# Patient Record
Sex: MALE | Race: BLACK OR AFRICAN AMERICAN | Employment: OTHER
[De-identification: names, ages, dates, MRNs, and addresses within clinical notes are randomized per-mention and may not be internally consistent; named-entity substitution may affect disease eponyms.]

---

## 2023-05-15 ENCOUNTER — APPOINTMENT (OUTPATIENT)
Facility: HOSPITAL | Age: 73
DRG: 286 | End: 2023-05-15
Payer: MEDICARE

## 2023-05-15 ENCOUNTER — HOSPITAL ENCOUNTER (INPATIENT)
Facility: HOSPITAL | Age: 73
LOS: 2 days | Discharge: HOME | DRG: 286 | End: 2023-05-17
Attending: STUDENT IN AN ORGANIZED HEALTH CARE EDUCATION/TRAINING PROGRAM | Admitting: EMERGENCY MEDICINE
Payer: MEDICARE

## 2023-05-15 DIAGNOSIS — I50.9 CONGESTIVE HEART FAILURE (CHF) (HCC): ICD-10-CM

## 2023-05-15 DIAGNOSIS — J96.01 ACUTE RESPIRATORY FAILURE WITH HYPOXIA (HCC): Primary | ICD-10-CM

## 2023-05-15 PROBLEM — E11.69 TYPE 2 DIABETES MELLITUS WITH OTHER SPECIFIED COMPLICATION (HCC): Status: ACTIVE | Noted: 2023-05-15

## 2023-05-15 PROBLEM — J98.01 BRONCHOSPASM: Status: ACTIVE | Noted: 2023-05-15

## 2023-05-15 PROBLEM — J96.00 ACUTE RESPIRATORY FAILURE (HCC): Status: ACTIVE | Noted: 2023-05-15

## 2023-05-15 PROBLEM — E78.5 DYSLIPIDEMIA: Status: ACTIVE | Noted: 2023-05-15

## 2023-05-15 LAB
ANION GAP SERPL CALC-SCNC: 8 MMOL/L (ref 3–18)
ARTERIAL PATENCY WRIST A: POSITIVE
BASE DEFICIT BLD-SCNC: 1.4 MMOL/L
BASOPHILS # BLD: 0.1 K/UL (ref 0–0.1)
BASOPHILS NFR BLD: 1 % (ref 0–2)
BDY SITE: ABNORMAL
BUN SERPL-MCNC: 10 MG/DL (ref 7–18)
BUN/CREAT SERPL: 10 (ref 12–20)
CALCIUM SERPL-MCNC: 8.3 MG/DL (ref 8.5–10.1)
CHLORIDE SERPL-SCNC: 104 MMOL/L (ref 100–111)
CO2 SERPL-SCNC: 26 MMOL/L (ref 21–32)
CREAT SERPL-MCNC: 1 MG/DL (ref 0.6–1.3)
DIFFERENTIAL METHOD BLD: ABNORMAL
EKG ATRIAL RATE: 85 BPM
EKG DIAGNOSIS: NORMAL
EKG P AXIS: 65 DEGREES
EKG P-R INTERVAL: 162 MS
EKG Q-T INTERVAL: 380 MS
EKG QRS DURATION: 82 MS
EKG QTC CALCULATION (BAZETT): 452 MS
EKG R AXIS: 59 DEGREES
EKG T AXIS: 243 DEGREES
EKG VENTRICULAR RATE: 85 BPM
EOSINOPHIL # BLD: 0.8 K/UL (ref 0–0.4)
EOSINOPHIL NFR BLD: 8 % (ref 0–5)
ERYTHROCYTE [DISTWIDTH] IN BLOOD BY AUTOMATED COUNT: 16.8 % (ref 11.6–14.5)
GAS FLOW.O2 O2 DELIVERY SYS: ABNORMAL
GAS FLOW.O2 SETTING OXYMISER: 10 BPM
GLUCOSE BLD STRIP.AUTO-MCNC: 222 MG/DL (ref 70–110)
GLUCOSE BLD STRIP.AUTO-MCNC: 237 MG/DL (ref 70–110)
GLUCOSE BLD STRIP.AUTO-MCNC: 259 MG/DL (ref 70–110)
GLUCOSE SERPL-MCNC: 262 MG/DL (ref 74–99)
HCO3 BLD-SCNC: 23.2 MMOL/L (ref 22–26)
HCT VFR BLD AUTO: 31.4 % (ref 36–48)
HGB BLD-MCNC: 9.7 G/DL (ref 13–16)
IMM GRANULOCYTES # BLD AUTO: 0.1 K/UL (ref 0–0.04)
IMM GRANULOCYTES NFR BLD AUTO: 1 % (ref 0–0.5)
IPAP/PIP/HIGH PEEP: 14
LYMPHOCYTES # BLD: 1.8 K/UL (ref 0.9–3.6)
LYMPHOCYTES NFR BLD: 18 % (ref 21–52)
MCH RBC QN AUTO: 26.2 PG (ref 24–34)
MCHC RBC AUTO-ENTMCNC: 30.9 G/DL (ref 31–37)
MCV RBC AUTO: 84.9 FL (ref 78–100)
MONOCYTES # BLD: 0.8 K/UL (ref 0.05–1.2)
MONOCYTES NFR BLD: 8 % (ref 3–10)
NEUTS SEG # BLD: 6.1 K/UL (ref 1.8–8)
NEUTS SEG NFR BLD: 64 % (ref 40–73)
NRBC # BLD: 0 K/UL (ref 0–0.01)
NRBC BLD-RTO: 0 PER 100 WBC
NT PRO BNP: 1310 PG/ML (ref 0–900)
O2/TOTAL GAS SETTING VFR VENT: 80 %
PCO2 BLD: 37.6 MMHG (ref 35–45)
PEEP RESPIRATORY: 6 CMH2O
PH BLD: 7.4 (ref 7.35–7.45)
PLATELET # BLD AUTO: 221 K/UL (ref 135–420)
PMV BLD AUTO: 9.6 FL (ref 9.2–11.8)
PO2 BLD: 386 MMHG (ref 80–100)
POTASSIUM SERPL-SCNC: 4.6 MMOL/L (ref 3.5–5.5)
RBC # BLD AUTO: 3.7 M/UL (ref 4.35–5.65)
RESPIRATORY RATE, POC: 12 (ref 5–40)
SAO2 % BLD: 100 % (ref 92–97)
SERVICE CMNT-IMP: ABNORMAL
SODIUM SERPL-SCNC: 138 MMOL/L (ref 136–145)
SPECIMEN TYPE: ABNORMAL
TROPONIN I SERPL HS-MCNC: 59 NG/L (ref 0–78)
TROPONIN I SERPL HS-MCNC: 73 NG/L (ref 0–78)
VT SETTING VENT: 872 ML
WBC # BLD AUTO: 9.5 K/UL (ref 4.6–13.2)

## 2023-05-15 PROCEDURE — 6360000002 HC RX W HCPCS: Performed by: INTERNAL MEDICINE

## 2023-05-15 PROCEDURE — 80048 BASIC METABOLIC PNL TOTAL CA: CPT

## 2023-05-15 PROCEDURE — 71045 X-RAY EXAM CHEST 1 VIEW: CPT

## 2023-05-15 PROCEDURE — 36600 WITHDRAWAL OF ARTERIAL BLOOD: CPT

## 2023-05-15 PROCEDURE — 82962 GLUCOSE BLOOD TEST: CPT

## 2023-05-15 PROCEDURE — 82785 ASSAY OF IGE: CPT

## 2023-05-15 PROCEDURE — 93010 ELECTROCARDIOGRAM REPORT: CPT | Performed by: INTERNAL MEDICINE

## 2023-05-15 PROCEDURE — 1100000000 HC RM PRIVATE

## 2023-05-15 PROCEDURE — 83880 ASSAY OF NATRIURETIC PEPTIDE: CPT

## 2023-05-15 PROCEDURE — 82803 BLOOD GASES ANY COMBINATION: CPT

## 2023-05-15 PROCEDURE — 93005 ELECTROCARDIOGRAM TRACING: CPT | Performed by: STUDENT IN AN ORGANIZED HEALTH CARE EDUCATION/TRAINING PROGRAM

## 2023-05-15 PROCEDURE — 93306 TTE W/DOPPLER COMPLETE: CPT

## 2023-05-15 PROCEDURE — 85025 COMPLETE CBC W/AUTO DIFF WBC: CPT

## 2023-05-15 PROCEDURE — 94660 CPAP INITIATION&MGMT: CPT

## 2023-05-15 PROCEDURE — 6360000002 HC RX W HCPCS: Performed by: PHYSICIAN ASSISTANT

## 2023-05-15 PROCEDURE — 5A09357 ASSISTANCE WITH RESPIRATORY VENTILATION, LESS THAN 24 CONSECUTIVE HOURS, CONTINUOUS POSITIVE AIRWAY PRESSURE: ICD-10-PCS | Performed by: INTERNAL MEDICINE

## 2023-05-15 PROCEDURE — 6370000000 HC RX 637 (ALT 250 FOR IP): Performed by: PHYSICIAN ASSISTANT

## 2023-05-15 PROCEDURE — 94640 AIRWAY INHALATION TREATMENT: CPT

## 2023-05-15 PROCEDURE — 36415 COLL VENOUS BLD VENIPUNCTURE: CPT

## 2023-05-15 PROCEDURE — 6370000000 HC RX 637 (ALT 250 FOR IP): Performed by: INTERNAL MEDICINE

## 2023-05-15 PROCEDURE — 94761 N-INVAS EAR/PLS OXIMETRY MLT: CPT

## 2023-05-15 PROCEDURE — 2700000000 HC OXYGEN THERAPY PER DAY

## 2023-05-15 PROCEDURE — 84484 ASSAY OF TROPONIN QUANT: CPT

## 2023-05-15 PROCEDURE — 93306 TTE W/DOPPLER COMPLETE: CPT | Performed by: INTERNAL MEDICINE

## 2023-05-15 PROCEDURE — 99223 1ST HOSP IP/OBS HIGH 75: CPT | Performed by: INTERNAL MEDICINE

## 2023-05-15 PROCEDURE — 99285 EMERGENCY DEPT VISIT HI MDM: CPT

## 2023-05-15 RX ORDER — CYCLOSPORINE 0.5 MG/ML
1 EMULSION OPHTHALMIC 2 TIMES DAILY
Status: DISCONTINUED | OUTPATIENT
Start: 2023-05-15 | End: 2023-05-18 | Stop reason: HOSPADM

## 2023-05-15 RX ORDER — ATORVASTATIN CALCIUM 40 MG/1
40 TABLET, FILM COATED ORAL DAILY
Status: DISCONTINUED | OUTPATIENT
Start: 2023-05-15 | End: 2023-05-15

## 2023-05-15 RX ORDER — INSULIN LISPRO 100 [IU]/ML
0-4 INJECTION, SOLUTION INTRAVENOUS; SUBCUTANEOUS NIGHTLY
Status: DISCONTINUED | OUTPATIENT
Start: 2023-05-15 | End: 2023-05-18 | Stop reason: HOSPADM

## 2023-05-15 RX ORDER — ATORVASTATIN CALCIUM 40 MG/1
40 TABLET, FILM COATED ORAL DAILY
COMMUNITY
Start: 2023-03-06

## 2023-05-15 RX ORDER — ASPIRIN 81 MG/1
81 TABLET, CHEWABLE ORAL DAILY
COMMUNITY

## 2023-05-15 RX ORDER — INSULIN LISPRO 100 [IU]/ML
0-4 INJECTION, SOLUTION INTRAVENOUS; SUBCUTANEOUS
Status: DISCONTINUED | OUTPATIENT
Start: 2023-05-15 | End: 2023-05-18 | Stop reason: HOSPADM

## 2023-05-15 RX ORDER — ACETAMINOPHEN 325 MG/1
650 TABLET ORAL EVERY 4 HOURS PRN
Status: DISCONTINUED | OUTPATIENT
Start: 2023-05-15 | End: 2023-05-18 | Stop reason: HOSPADM

## 2023-05-15 RX ORDER — ATORVASTATIN CALCIUM 40 MG/1
40 TABLET, FILM COATED ORAL NIGHTLY
Status: DISCONTINUED | OUTPATIENT
Start: 2023-05-15 | End: 2023-05-18 | Stop reason: HOSPADM

## 2023-05-15 RX ORDER — BUDESONIDE 0.5 MG/2ML
0.5 INHALANT ORAL 2 TIMES DAILY
Status: DISCONTINUED | OUTPATIENT
Start: 2023-05-15 | End: 2023-05-18 | Stop reason: HOSPADM

## 2023-05-15 RX ORDER — ONDANSETRON 2 MG/ML
4 INJECTION INTRAMUSCULAR; INTRAVENOUS EVERY 6 HOURS PRN
Status: DISCONTINUED | OUTPATIENT
Start: 2023-05-15 | End: 2023-05-18 | Stop reason: HOSPADM

## 2023-05-15 RX ORDER — ARFORMOTEROL TARTRATE 15 UG/2ML
15 SOLUTION RESPIRATORY (INHALATION) 2 TIMES DAILY
Status: DISCONTINUED | OUTPATIENT
Start: 2023-05-15 | End: 2023-05-18 | Stop reason: HOSPADM

## 2023-05-15 RX ORDER — MEMANTINE HYDROCHLORIDE 5 MG/1
7.5 TABLET ORAL 2 TIMES DAILY
Status: DISCONTINUED | OUTPATIENT
Start: 2023-05-15 | End: 2023-05-18 | Stop reason: HOSPADM

## 2023-05-15 RX ORDER — BISACODYL 10 MG
10 SUPPOSITORY, RECTAL RECTAL DAILY PRN
Status: DISCONTINUED | OUTPATIENT
Start: 2023-05-15 | End: 2023-05-18 | Stop reason: HOSPADM

## 2023-05-15 RX ORDER — TRAVOPROST OPHTHALMIC SOLUTION 0.04 MG/ML
1 SOLUTION OPHTHALMIC NIGHTLY
Status: ON HOLD | COMMUNITY
End: 2023-05-17 | Stop reason: HOSPADM

## 2023-05-15 RX ORDER — FUROSEMIDE 10 MG/ML
40 INJECTION INTRAMUSCULAR; INTRAVENOUS DAILY
Status: DISCONTINUED | OUTPATIENT
Start: 2023-05-15 | End: 2023-05-15

## 2023-05-15 RX ORDER — HEPARIN SODIUM 5000 [USP'U]/ML
5000 INJECTION, SOLUTION INTRAVENOUS; SUBCUTANEOUS EVERY 8 HOURS SCHEDULED
Status: DISCONTINUED | OUTPATIENT
Start: 2023-05-15 | End: 2023-05-18 | Stop reason: HOSPADM

## 2023-05-15 RX ORDER — FUROSEMIDE 10 MG/ML
20 INJECTION INTRAMUSCULAR; INTRAVENOUS 2 TIMES DAILY
Status: DISCONTINUED | OUTPATIENT
Start: 2023-05-15 | End: 2023-05-16

## 2023-05-15 RX ORDER — PREDNISONE 20 MG/1
40 TABLET ORAL DAILY
Status: DISCONTINUED | OUTPATIENT
Start: 2023-05-15 | End: 2023-05-18 | Stop reason: HOSPADM

## 2023-05-15 RX ORDER — IPRATROPIUM BROMIDE AND ALBUTEROL SULFATE 2.5; .5 MG/3ML; MG/3ML
1 SOLUTION RESPIRATORY (INHALATION) EVERY 4 HOURS PRN
Status: DISCONTINUED | OUTPATIENT
Start: 2023-05-15 | End: 2023-05-18 | Stop reason: HOSPADM

## 2023-05-15 RX ORDER — TIMOLOL MALEATE 5 MG/ML
1 SOLUTION/ DROPS OPHTHALMIC 2 TIMES DAILY
Status: DISCONTINUED | OUTPATIENT
Start: 2023-05-15 | End: 2023-05-18 | Stop reason: HOSPADM

## 2023-05-15 RX ORDER — PREDNISOLONE ACETATE 10 MG/ML
1 SUSPENSION/ DROPS OPHTHALMIC DAILY
Status: DISCONTINUED | OUTPATIENT
Start: 2023-05-15 | End: 2023-05-18 | Stop reason: HOSPADM

## 2023-05-15 RX ORDER — LATANOPROST 50 UG/ML
1 SOLUTION/ DROPS OPHTHALMIC NIGHTLY
Status: DISCONTINUED | OUTPATIENT
Start: 2023-05-15 | End: 2023-05-18 | Stop reason: HOSPADM

## 2023-05-15 RX ORDER — GLIMEPIRIDE 2 MG/1
1 TABLET ORAL 3 TIMES DAILY
COMMUNITY

## 2023-05-15 RX ORDER — LATANOPROST 50 UG/ML
1 SOLUTION/ DROPS OPHTHALMIC DAILY
COMMUNITY
Start: 2022-02-02

## 2023-05-15 RX ORDER — DEXTROSE MONOHYDRATE 100 MG/ML
INJECTION, SOLUTION INTRAVENOUS CONTINUOUS PRN
Status: DISCONTINUED | OUTPATIENT
Start: 2023-05-15 | End: 2023-05-18 | Stop reason: HOSPADM

## 2023-05-15 RX ORDER — PREDNISOLONE ACETATE 10 MG/ML
1 SUSPENSION/ DROPS OPHTHALMIC DAILY
COMMUNITY
Start: 2023-03-20

## 2023-05-15 RX ORDER — ASPIRIN 81 MG/1
81 TABLET, CHEWABLE ORAL DAILY
Status: DISCONTINUED | OUTPATIENT
Start: 2023-05-15 | End: 2023-05-18 | Stop reason: HOSPADM

## 2023-05-15 RX ORDER — CYCLOSPORINE 0.5 MG/ML
1 EMULSION OPHTHALMIC 2 TIMES DAILY
COMMUNITY

## 2023-05-15 RX ADMIN — FUROSEMIDE 20 MG: 10 INJECTION, SOLUTION INTRAMUSCULAR; INTRAVENOUS at 18:06

## 2023-05-15 RX ADMIN — PREDNISOLONE ACETATE 1 DROP: 10 SUSPENSION/ DROPS OPHTHALMIC at 13:31

## 2023-05-15 RX ADMIN — BUDESONIDE 500 MCG: 0.5 INHALANT RESPIRATORY (INHALATION) at 21:34

## 2023-05-15 RX ADMIN — FUROSEMIDE 20 MG: 10 INJECTION, SOLUTION INTRAMUSCULAR; INTRAVENOUS at 13:20

## 2023-05-15 RX ADMIN — TIMOLOL MALEATE 1 DROP: 5 SOLUTION OPHTHALMIC at 23:01

## 2023-05-15 RX ADMIN — BUDESONIDE 500 MCG: 0.5 INHALANT RESPIRATORY (INHALATION) at 13:18

## 2023-05-15 RX ADMIN — HEPARIN SODIUM 5000 UNITS: 5000 INJECTION INTRAVENOUS; SUBCUTANEOUS at 23:02

## 2023-05-15 RX ADMIN — ARFORMOTEROL TARTRATE 15 MCG: 15 SOLUTION RESPIRATORY (INHALATION) at 13:18

## 2023-05-15 RX ADMIN — HEPARIN SODIUM 5000 UNITS: 5000 INJECTION INTRAVENOUS; SUBCUTANEOUS at 13:18

## 2023-05-15 RX ADMIN — LATANOPROST 1 DROP: 50 SOLUTION OPHTHALMIC at 23:02

## 2023-05-15 RX ADMIN — ATORVASTATIN CALCIUM 40 MG: 40 TABLET, FILM COATED ORAL at 23:03

## 2023-05-15 RX ADMIN — INSULIN LISPRO 1 UNITS: 100 INJECTION, SOLUTION INTRAVENOUS; SUBCUTANEOUS at 18:06

## 2023-05-15 RX ADMIN — ASPIRIN 81 MG CHEWABLE TABLET 81 MG: 81 TABLET CHEWABLE at 13:19

## 2023-05-15 RX ADMIN — ARFORMOTEROL TARTRATE 15 MCG: 15 SOLUTION RESPIRATORY (INHALATION) at 21:34

## 2023-05-15 RX ADMIN — PREDNISONE 40 MG: 20 TABLET ORAL at 13:19

## 2023-05-15 ASSESSMENT — ENCOUNTER SYMPTOMS
ABDOMINAL PAIN: 0
VOMITING: 0
SHORTNESS OF BREATH: 1
CHEST TIGHTNESS: 0
DIARRHEA: 0
SORE THROAT: 0
NAUSEA: 0

## 2023-05-15 NOTE — CONSULTS
[facility]. Pulmonary, Critical Care, and Sleep Medicine    Initial Patient Referral report    Name: Daniel Grant MRN: [id number]   : 1950 Hospital: [facility]   Date: 5/15/2023        IMPRESSION:   Acute hypoxic respiratory failure secondary to acute bronchospasm-differential diagnosis acute exacerbation of asthmatic bronchitis versus cardiac asthma-acute CHF. Patient has responded to current treatment but needs further evaluation  History of atrial fibrillation  Eosinophilia-8% noted on differential diagnosis suspicious for allergic etiologies  History of recent dyspnea on exertion and fatigue-needs further evaluation  Ex-smoker-quit 10 years back       Patient Active Problem List   Diagnosis    Microscopic hematuria    Acute respiratory failure (HCC)      RECOMMENDATIONS:   Oxygen-given to maintain saturation more than 92%  BIPAP/CPAP-can be added to decrease work of breathing should he have any further recurrence  Bronchial hygiene protocol  Bronchodilators-I would treat him with structured bronchodilator regime-Brovona and Pulmicort started with as needed DuoNebs. Steroids-add prednisone 40 mg daily for 5 days for exacerbation pending further work-up  Antibiotics-not needed  Aspiration precautions  We will check serum IgE level. If indicated will pursue further work-up and interventions  Recommend further cardiology evaluation-echocardiogram and cardiac consult  Out patient testing- PFT, 6 min walk,  Assess home Oxygen needs at discharge  OT, PT, OOB and ambulate  Healthy weight  Will Follow  DVT, PUD prophylaxis     Subjective:   05/15/23   This patient has been seen and evaluated at the request of Dr. Erica Zavaleta for acute respiratory failure. Patient is a 67 y.o. male with past medical history of atrial fibrillation and diabetes presenting to the emergency department via EMS for evaluation of respiratory distress.     Patient was sleeping when he started
admitted for Acute respiratory failure ([facility].) [J96.00]. Patient complains of: MAGNO Mercer is a 67 y.o. male, pmhx as stated above, who we are seeing in consult for concerns of acute CHF. Patient reports waking up from his sleep gasping for air. He states he was extremely SOB with wheezing, states he has never experienced this before. He states he felt well prior to going to bed. He admits to having less endurance over the last few months. He says if he walks around [employer], he would have to sit down because of SOB. This would subsequently improve with rest. Per EMS, patients O2 saturations were 80% on room air and he was subsequently placed on bipap. Denies CP, weight gain, orthopnea, LE edema, diaphoresis, N/V/D, dizziness, near syncope or syncope. Cardiac risk factors: advanced age (older than 54 for men, 72 for women), diabetes mellitus, dyslipidemia, hypertension, male gender, and smoking/ tobacco exposure  Review of Symptoms:  Except as stated above include:  Constitutional:  negative  Respiratory:  as per HPI   Cardiovascular:  negative  Gastrointestinal: negative  Genitourinary:  negative  Musculoskeletal:  Negative  Neurological:  Negative  Dermatological:  Negative  Endocrinological: Negative  Psychological:  Negative    Pertinent items are noted in HPI. Past Medical History:   No past medical history on file. Social History:     Social History     Socioeconomic History    Marital status: Single        Family History:   No family history on file. Medications:   No Known Allergies     No current facility-administered medications for this encounter. No current outpatient medications on file.          Physical Exam:     Vitals:    05/15/23 1000   BP: 118/73   Pulse: 89   Resp: 19   Temp:    SpO2:        TELE: normal sinus rhythm    BP Readings from Last 3 Encounters:   05/15/23 118/73     Pulse Readings from Last 3 Encounters:   05/15/23 89     Wt Readings from Last 3 Encounters:

## 2023-05-15 NOTE — H&P
History and Physical    Patient: Jerry Willett               Sex: male          DOA: 5/15/2023       YOB: 1950      Age:  67 y.o.        LOS:  LOS: 0 days        Chief complaint: Dyspnea on exertion and wheezing since early this morning    HPI:     Jerry Willett is a 67 y.o. male who presented to emergency room for further evaluation of sudden onset of shortness of breath started this morning. Patient also had wheezing. However, he has had some dyspnea hydration for more than a month and feeling tired. Patient says he is in process to move out of this town to [facility] and he is cleaning his home and also exposing himself to dust.  He called EMS due to respiratory distress. He was found out to have hypoxia with SPO2 of 86 percentage on room air and was placed on CPAP. He was given sublingual nitro and was also given D10 as his blood sugar was 48. Patient is currently feeling much better. He is now on oxygen via nasal cannula. Denies any chest pain or abdominal pain. No nausea or vomiting. Had 2 loose bowel movement yesterday but no constipation. No tingling or numbness. No headaches or dizziness. Shortness of breath getting better. No wheezes currently. He says that he had asthma as a child. He was also diagnosed with atrial fibrillation in 1999 and was following cardiologist for 2 years. He has history of glaucoma and under care of an ophthalmologist.  He is an ex-smoker. Does not smoke cigarettes or drink any alcohol currently. No past medical history on file.     Social History     Socioeconomic History    Marital status: Single     Spouse name: Not on file    Number of children: Not on file    Years of education: Not on file    Highest education level: Not on file   Occupational History    Not on file   Tobacco Use    Smoking status: Not on file    Smokeless tobacco: Not on file   Substance and Sexual Activity    Alcohol use: Not on file    Drug use: Not on file

## 2023-05-15 NOTE — ED NOTES
CXR completed. Labs collected, labeled, and walked to lab for processing. EKG completed at this time. Provided to and signed by MD. YUSUF at bedside to obtain ABG's.       Bang Ibrahim RN  05/15/23 8738

## 2023-05-15 NOTE — ED NOTES
Report was given to CHEMA May RN pt transported by patient transport.          Sheron Montalvo RN  05/15/23 1041

## 2023-05-15 NOTE — ED NOTES
Respiratory therapist notified that BIPAP was removed. Pt tolerating 2L NC at this time. BIPAP remains at bedside if needed.       Chidi Lino RN  05/15/23 0082

## 2023-05-15 NOTE — ED NOTES
MD requesting pt trial off of BIPAP    Pt taken off BIPAP at this time and placed on 2L NC.         Shira Hamlin RN  05/15/23 8788

## 2023-05-15 NOTE — ED NOTES
Assume care of patient at time of shift change  Bedside report received from ALBERTO Corona. Pt. Seen by Dr. Tony Dolan and patient was taken off bi-pap and placed on oxygen via NC. Is tolerating. Oxygen saturation remain high 90s.        Samuel Linares, RN  05/15/23 [address] [place] [address], RN  05/15/23 2833

## 2023-05-15 NOTE — ED PROVIDER NOTES
EMERGENCY DEPARTMENT HISTORY AND PHYSICAL EXAM      Date: 5/15/2023  Patient Name: Aditya Taylor    History of Presenting Illness     Chief Complaint   Patient presents with    Shortness of Breath       Patient is a 66-year-old male with past medical history of atrial fibrillation and diabetes presenting to the emergency department via EMS for evaluation of respiratory distress. Patient was sleeping when he started wheezing and felt extremely short of breath all of a sudden. Was found to be 86% on room air for EMS and was placed on CPAP. Sublingual nitro was given by EMS x2. His blood sugar was found to be 48 and patient was infused D10 in route. On arrival, patient states that his breathing is feeling improved. He is alert and oriented. Patient denies history of heart failure or COPD. Denies palpitations or chest pain, cough, fevers or chills        PCP: None None    No current facility-administered medications for this encounter. No current outpatient medications on file. Past History     Past Medical History:  No past medical history on file. Past Surgical History:  No past surgical history on file. Family History:  No family history on file. Social History: Allergies:  No Known Allergies      Review of Systems       Review of Systems   Constitutional:  Negative for activity change, appetite change, fatigue and fever. HENT:  Negative for congestion and sore throat. Respiratory:  Positive for shortness of breath. Negative for chest tightness. Cardiovascular:  Negative for chest pain. Gastrointestinal:  Negative for abdominal pain, diarrhea, nausea and vomiting. Genitourinary:  Negative for difficulty urinating, dysuria, flank pain, hematuria and urgency. Musculoskeletal:  Negative for arthralgias. Skin:  Negative for rash. Neurological:  Negative for dizziness, weakness, light-headedness, numbness and headaches. Psychiatric/Behavioral:  Negative for agitation.

## 2023-05-15 NOTE — ED PROVIDER NOTES
ED continued care note    7:20 AM EDT  Signed out to me by Dr. Yadira Morillo at 7 AM, patient with respiratory distress, 80% for EMS on room air, 2 sublingual nitro by EMS. Placed on BiPAP here and doing well with this symptoms improved. Reviewed at 7:20 AM EDT  Patient Vitals for the past 12 hrs:   Temp Pulse Resp BP SpO2   05/15/23 0707 -- 83 16 115/66 100 %   05/15/23 0600 -- 84 15 113/60 100 %   05/15/23 0559 -- 89 22 -- 100 %   05/15/23 0556 97.7 °F (36.5 °C) 88 13 113/60 100 %       ED Course as of 05/15/23 0829   Mon May 15, 2023   0732 We will try to get him off of BiPAP, will admit, no explanation for this episode. [CB]      ED Course User Index  [CB] Susanna Anderson MD   8:29 AM patient is now off of BiPAP doing well on a nasal cannula resting comfortably, discussed with Dr. Shabnam Young hospitalist will admit the patient, possibly first episode of CHF he had rales and little bit of fluid on his chest x-ray. He is not an extremis anymore but has no explanation for this episode and had a documented hypoxia on room air. He will be admitted      Clinical Impression:   1.  Acute respiratory failure with hypoxia ([facility]         Susanna Anderson MD  05/15/23 0175

## 2023-05-15 NOTE — ED TRIAGE NOTES
Pt arrives via EMS [facility]9 with complaints of SOB x1 hour. States he was trying to sleep when he was unable to do so d/t wheezing and SOB which came on suddenly. Per medics, SPO2 86% on room air. Placed pt on CPAP with SPO2 WNL. SL Nitro given x2. EMS BS 48.   2 PIVs placed by EMS- R AC and L AC. D10 infusing upon arrival.       Pt arrives awake, alert, and oriented x4. No complaints of pain. Speaking in full complete sentences. Pt transitioned from EMS CPAP to BIPAP. RT at bedside managing airway. SPO2 100% on BIPAP. /60  Sinus Rhythm with HR 80's    POC       Self reported medical Hx of DM II and Afib. No past medical history on file.

## 2023-05-16 PROBLEM — D72.10 EOSINOPHILIA: Status: ACTIVE | Noted: 2023-05-16

## 2023-05-16 PROBLEM — J45.31 MILD PERSISTENT ASTHMA WITH ACUTE EXACERBATION: Status: ACTIVE | Noted: 2023-05-16

## 2023-05-16 LAB
ACT BLD: 221 SECS (ref 79–138)
ALBUMIN SERPL-MCNC: 2.9 G/DL (ref 3.4–5)
ALBUMIN/GLOB SERPL: 0.6 (ref 0.8–1.7)
ALP SERPL-CCNC: 94 U/L (ref 45–117)
ALT SERPL-CCNC: 42 U/L (ref 16–61)
ANION GAP SERPL CALC-SCNC: 8 MMOL/L (ref 3–18)
AST SERPL-CCNC: 36 U/L (ref 10–38)
BASOPHILS # BLD: 0 K/UL (ref 0–0.1)
BASOPHILS NFR BLD: 0 % (ref 0–2)
BILIRUB SERPL-MCNC: 0.4 MG/DL (ref 0.2–1)
BUN SERPL-MCNC: 13 MG/DL (ref 7–18)
BUN/CREAT SERPL: 15 (ref 12–20)
CALCIUM SERPL-MCNC: 8.7 MG/DL (ref 8.5–10.1)
CHLORIDE SERPL-SCNC: 107 MMOL/L (ref 100–111)
CHOLEST SERPL-MCNC: 93 MG/DL
CO2 SERPL-SCNC: 24 MMOL/L (ref 21–32)
CREAT SERPL-MCNC: 0.87 MG/DL (ref 0.6–1.3)
DIFFERENTIAL METHOD BLD: ABNORMAL
ECHO BSA: 2.1 M2
EOSINOPHIL # BLD: 0.1 K/UL (ref 0–0.4)
EOSINOPHIL NFR BLD: 1 % (ref 0–5)
ERYTHROCYTE [DISTWIDTH] IN BLOOD BY AUTOMATED COUNT: 16.8 % (ref 11.6–14.5)
GLOBULIN SER CALC-MCNC: 4.5 G/DL (ref 2–4)
GLUCOSE BLD STRIP.AUTO-MCNC: 187 MG/DL (ref 70–110)
GLUCOSE SERPL-MCNC: 173 MG/DL (ref 74–99)
HBA1C MFR BLD: 7.1 % (ref 4.2–5.6)
HCT VFR BLD AUTO: 32.5 % (ref 36–48)
HDLC SERPL-MCNC: 36 MG/DL (ref 40–60)
HDLC SERPL: 2.6 (ref 0–5)
HGB BLD-MCNC: 10 G/DL (ref 13–16)
IMM GRANULOCYTES # BLD AUTO: 0 K/UL (ref 0–0.04)
IMM GRANULOCYTES NFR BLD AUTO: 0 % (ref 0–0.5)
LDLC SERPL CALC-MCNC: 44 MG/DL (ref 0–100)
LIPID PANEL: ABNORMAL
LYMPHOCYTES # BLD: 1.8 K/UL (ref 0.9–3.6)
LYMPHOCYTES NFR BLD: 31 % (ref 21–52)
MAGNESIUM SERPL-MCNC: 1.9 MG/DL (ref 1.6–2.6)
MAGNESIUM SERPL-MCNC: 2.1 MG/DL (ref 1.6–2.6)
MCH RBC QN AUTO: 25.6 PG (ref 24–34)
MCHC RBC AUTO-ENTMCNC: 30.8 G/DL (ref 31–37)
MCV RBC AUTO: 83.3 FL (ref 78–100)
MONOCYTES # BLD: 0.6 K/UL (ref 0.05–1.2)
MONOCYTES NFR BLD: 11 % (ref 3–10)
NEUTS SEG # BLD: 3.2 K/UL (ref 1.8–8)
NEUTS SEG NFR BLD: 56 % (ref 40–73)
NRBC # BLD: 0 K/UL (ref 0–0.01)
NRBC BLD-RTO: 0 PER 100 WBC
PLATELET # BLD AUTO: 232 K/UL (ref 135–420)
PMV BLD AUTO: 11 FL (ref 9.2–11.8)
POTASSIUM SERPL-SCNC: 4 MMOL/L (ref 3.5–5.5)
PROT SERPL-MCNC: 7.4 G/DL (ref 6.4–8.2)
RBC # BLD AUTO: 3.9 M/UL (ref 4.35–5.65)
SODIUM SERPL-SCNC: 139 MMOL/L (ref 136–145)
T4 FREE SERPL-MCNC: 1.2 NG/DL (ref 0.7–1.5)
TRIGL SERPL-MCNC: 65 MG/DL
TSH SERPL DL<=0.05 MIU/L-ACNC: 0.47 UIU/ML (ref 0.36–3.74)
VLDLC SERPL CALC-MCNC: 13 MG/DL
WBC # BLD AUTO: 5.6 K/UL (ref 4.6–13.2)

## 2023-05-16 PROCEDURE — 85347 COAGULATION TIME ACTIVATED: CPT | Performed by: INTERNAL MEDICINE

## 2023-05-16 PROCEDURE — 97165 OT EVAL LOW COMPLEX 30 MIN: CPT

## 2023-05-16 PROCEDURE — 84443 ASSAY THYROID STIM HORMONE: CPT

## 2023-05-16 PROCEDURE — 94761 N-INVAS EAR/PLS OXIMETRY MLT: CPT

## 2023-05-16 PROCEDURE — 82962 GLUCOSE BLOOD TEST: CPT

## 2023-05-16 PROCEDURE — 99153 MOD SED SAME PHYS/QHP EA: CPT | Performed by: INTERNAL MEDICINE

## 2023-05-16 PROCEDURE — 93571 IV DOP VEL&/PRESS C FLO 1ST: CPT | Performed by: INTERNAL MEDICINE

## 2023-05-16 PROCEDURE — 99152 MOD SED SAME PHYS/QHP 5/>YRS: CPT | Performed by: INTERNAL MEDICINE

## 2023-05-16 PROCEDURE — 1100000000 HC RM PRIVATE

## 2023-05-16 PROCEDURE — 6370000000 HC RX 637 (ALT 250 FOR IP): Performed by: INTERNAL MEDICINE

## 2023-05-16 PROCEDURE — 6360000002 HC RX W HCPCS: Performed by: INTERNAL MEDICINE

## 2023-05-16 PROCEDURE — C1887 CATHETER, GUIDING: HCPCS | Performed by: INTERNAL MEDICINE

## 2023-05-16 PROCEDURE — B2111ZZ FLUOROSCOPY OF MULTIPLE CORONARY ARTERIES USING LOW OSMOLAR CONTRAST: ICD-10-PCS | Performed by: INTERNAL MEDICINE

## 2023-05-16 PROCEDURE — 6360000004 HC RX CONTRAST MEDICATION: Performed by: INTERNAL MEDICINE

## 2023-05-16 PROCEDURE — C1713 ANCHOR/SCREW BN/BN,TIS/BN: HCPCS | Performed by: INTERNAL MEDICINE

## 2023-05-16 PROCEDURE — C1894 INTRO/SHEATH, NON-LASER: HCPCS | Performed by: INTERNAL MEDICINE

## 2023-05-16 PROCEDURE — 2500000003 HC RX 250 WO HCPCS: Performed by: INTERNAL MEDICINE

## 2023-05-16 PROCEDURE — 99233 SBSQ HOSP IP/OBS HIGH 50: CPT | Performed by: INTERNAL MEDICINE

## 2023-05-16 PROCEDURE — C1769 GUIDE WIRE: HCPCS | Performed by: INTERNAL MEDICINE

## 2023-05-16 PROCEDURE — 36415 COLL VENOUS BLD VENIPUNCTURE: CPT

## 2023-05-16 PROCEDURE — 2700000000 HC OXYGEN THERAPY PER DAY

## 2023-05-16 PROCEDURE — 84439 ASSAY OF FREE THYROXINE: CPT

## 2023-05-16 PROCEDURE — 93458 L HRT ARTERY/VENTRICLE ANGIO: CPT | Performed by: INTERNAL MEDICINE

## 2023-05-16 PROCEDURE — 83735 ASSAY OF MAGNESIUM: CPT

## 2023-05-16 PROCEDURE — 80053 COMPREHEN METABOLIC PANEL: CPT

## 2023-05-16 PROCEDURE — 76937 US GUIDE VASCULAR ACCESS: CPT | Performed by: INTERNAL MEDICINE

## 2023-05-16 PROCEDURE — 85347 COAGULATION TIME ACTIVATED: CPT

## 2023-05-16 PROCEDURE — 2709999900 HC NON-CHARGEABLE SUPPLY: Performed by: INTERNAL MEDICINE

## 2023-05-16 PROCEDURE — 6370000000 HC RX 637 (ALT 250 FOR IP): Performed by: PHYSICIAN ASSISTANT

## 2023-05-16 PROCEDURE — 80061 LIPID PANEL: CPT

## 2023-05-16 PROCEDURE — 97535 SELF CARE MNGMENT TRAINING: CPT

## 2023-05-16 PROCEDURE — 93572 IV DOP VEL&/PRESS C FLO EA: CPT | Performed by: INTERNAL MEDICINE

## 2023-05-16 PROCEDURE — 94640 AIRWAY INHALATION TREATMENT: CPT

## 2023-05-16 PROCEDURE — 85025 COMPLETE CBC W/AUTO DIFF WBC: CPT

## 2023-05-16 PROCEDURE — 83036 HEMOGLOBIN GLYCOSYLATED A1C: CPT

## 2023-05-16 PROCEDURE — 99232 SBSQ HOSP IP/OBS MODERATE 35: CPT | Performed by: INTERNAL MEDICINE

## 2023-05-16 PROCEDURE — 4A023N7 MEASUREMENT OF CARDIAC SAMPLING AND PRESSURE, LEFT HEART, PERCUTANEOUS APPROACH: ICD-10-PCS | Performed by: INTERNAL MEDICINE

## 2023-05-16 PROCEDURE — 76000 FLUOROSCOPY <1 HR PHYS/QHP: CPT | Performed by: INTERNAL MEDICINE

## 2023-05-16 RX ORDER — FUROSEMIDE 40 MG/1
40 TABLET ORAL 2 TIMES DAILY
Status: DISCONTINUED | OUTPATIENT
Start: 2023-05-16 | End: 2023-05-17

## 2023-05-16 RX ORDER — FENTANYL CITRATE 50 UG/ML
INJECTION, SOLUTION INTRAMUSCULAR; INTRAVENOUS PRN
Status: DISCONTINUED | OUTPATIENT
Start: 2023-05-16 | End: 2023-05-16 | Stop reason: HOSPADM

## 2023-05-16 RX ORDER — ADENOSINE 3 MG/ML
INJECTION, SOLUTION INTRAVENOUS CONTINUOUS PRN
Status: DISCONTINUED | OUTPATIENT
Start: 2023-05-16 | End: 2023-05-16 | Stop reason: HOSPADM

## 2023-05-16 RX ORDER — INSULIN GLARGINE 100 [IU]/ML
6 INJECTION, SOLUTION SUBCUTANEOUS DAILY
Status: DISCONTINUED | OUTPATIENT
Start: 2023-05-16 | End: 2023-05-18 | Stop reason: HOSPADM

## 2023-05-16 RX ORDER — HEPARIN SODIUM 1000 [USP'U]/ML
INJECTION, SOLUTION INTRAVENOUS; SUBCUTANEOUS PRN
Status: DISCONTINUED | OUTPATIENT
Start: 2023-05-16 | End: 2023-05-16 | Stop reason: HOSPADM

## 2023-05-16 RX ORDER — NITROGLYCERIN 40 MG/100ML
INJECTION INTRAVENOUS CONTINUOUS PRN
Status: COMPLETED | OUTPATIENT
Start: 2023-05-16 | End: 2023-05-16

## 2023-05-16 RX ORDER — MIDAZOLAM HYDROCHLORIDE 1 MG/ML
INJECTION INTRAMUSCULAR; INTRAVENOUS PRN
Status: DISCONTINUED | OUTPATIENT
Start: 2023-05-16 | End: 2023-05-16 | Stop reason: HOSPADM

## 2023-05-16 RX ORDER — ASPIRIN 81 MG/1
TABLET, CHEWABLE ORAL
Status: DISPENSED
Start: 2023-05-16 | End: 2023-05-16

## 2023-05-16 RX ORDER — HEPARIN SODIUM 200 [USP'U]/100ML
INJECTION, SOLUTION INTRAVENOUS CONTINUOUS PRN
Status: COMPLETED | OUTPATIENT
Start: 2023-05-16 | End: 2023-05-16

## 2023-05-16 RX ADMIN — ARFORMOTEROL TARTRATE 15 MCG: 15 SOLUTION RESPIRATORY (INHALATION) at 07:44

## 2023-05-16 RX ADMIN — ARFORMOTEROL TARTRATE 15 MCG: 15 SOLUTION RESPIRATORY (INHALATION) at 19:51

## 2023-05-16 RX ADMIN — PREDNISOLONE ACETATE 1 DROP: 10 SUSPENSION/ DROPS OPHTHALMIC at 13:30

## 2023-05-16 RX ADMIN — LATANOPROST 1 DROP: 50 SOLUTION OPHTHALMIC at 19:57

## 2023-05-16 RX ADMIN — HEPARIN SODIUM 5000 UNITS: 5000 INJECTION INTRAVENOUS; SUBCUTANEOUS at 13:58

## 2023-05-16 RX ADMIN — TIMOLOL MALEATE 1 DROP: 5 SOLUTION OPHTHALMIC at 19:56

## 2023-05-16 RX ADMIN — ATORVASTATIN CALCIUM 40 MG: 40 TABLET, FILM COATED ORAL at 19:51

## 2023-05-16 RX ADMIN — PREDNISOLONE ACETATE 1 DROP: 10 SUSPENSION/ DROPS OPHTHALMIC at 19:56

## 2023-05-16 RX ADMIN — ASPIRIN 81 MG CHEWABLE TABLET 81 MG: 81 TABLET CHEWABLE at 09:11

## 2023-05-16 RX ADMIN — CYCLOSPORINE 1 DROP: 0.5 EMULSION OPHTHALMIC at 08:00

## 2023-05-16 RX ADMIN — HEPARIN SODIUM 5000 UNITS: 5000 INJECTION INTRAVENOUS; SUBCUTANEOUS at 20:43

## 2023-05-16 RX ADMIN — BUDESONIDE 500 MCG: 0.5 INHALANT RESPIRATORY (INHALATION) at 19:45

## 2023-05-16 RX ADMIN — IPRATROPIUM BROMIDE AND ALBUTEROL SULFATE 1 AMPULE: 2.5; .5 SOLUTION RESPIRATORY (INHALATION) at 19:45

## 2023-05-16 RX ADMIN — BUDESONIDE 500 MCG: 0.5 INHALANT RESPIRATORY (INHALATION) at 07:44

## 2023-05-16 RX ADMIN — TIMOLOL MALEATE 1 DROP: 5 SOLUTION OPHTHALMIC at 08:00

## 2023-05-16 RX ADMIN — CYCLOSPORINE 1 DROP: 0.5 EMULSION OPHTHALMIC at 19:57

## 2023-05-16 RX ADMIN — INSULIN GLARGINE 6 UNITS: 100 INJECTION, SOLUTION SUBCUTANEOUS at 13:15

## 2023-05-16 RX ADMIN — FUROSEMIDE 40 MG: 40 TABLET ORAL at 13:58

## 2023-05-16 ASSESSMENT — PAIN SCALES - GENERAL
PAINLEVEL_OUTOF10: 0
PAINLEVEL_OUTOF10: 0

## 2023-05-16 NOTE — Clinical Note
TRANSFER - OUT REPORT:    Verbal report given to Cedrick Sainz RN on Cecille Gee  being transferred to [id number] for routine progression of patient care       Report consisted of patient's Situation, Background, Assessment and   Recommendations(SBAR). Information from the following report(s) Nurse Handoff Report was reviewed with the receiving nurse. [place] Assessment: Presents to emergency department  because of falls (Syncope, seizure, or loss of consciousness): No, Age > 79: Yes, Altered Mental Status, Intoxication with alcohol or substance confusion (Disorientation, impaired judgment, poor safety awaremess, or inability to follow instructions): No, Impaired Mobility: Ambulates or transfers with assistive devices or assistance; Unable to ambulate or transer.: No, Nursing Judgement: No  Lines:   Peripheral IV 05/15/23 Left Antecubital (Active)   Site Assessment Clean, dry & intact 05/16/23 0917   Line Status Flushed 05/16/23 0917   Line Care Connections checked and tightened 05/15/23 2000   Phlebitis Assessment No symptoms 05/16/23 0917   Infiltration Assessment 0 05/16/23 0917   Alcohol Cap Used Yes 05/15/23 2000   Dressing Status Clean, dry & intact 05/16/23 0917   Dressing Type Transparent 05/16/23 0917       Peripheral IV 05/16/23 Right Antecubital (Active)   Site Assessment Clean, dry & intact 05/16/23 0917   Line Status Flushed 05/16/23 0917   Phlebitis Assessment No symptoms 05/16/23 0917   Infiltration Assessment 0 05/16/23 0917   Dressing Status Clean, dry & intact 05/16/23 0917   Dressing Type Transparent 05/16/23 0917        Opportunity for questions and clarification was provided by: J. Gilford Kleine, RN  Patient transported with:  Damien Delgado

## 2023-05-16 NOTE — PLAN OF CARE
Problem: Respiratory - Adult  Goal: Achieves optimal ventilation and oxygenation  Outcome: Progressing     Problem: Skin/Tissue Integrity - Adult  Goal: Skin integrity remains intact  5/16/2023 0735 by Stacey Cantu RN  Outcome: Progressing  5/15/2023 1759 by Stacey Cantu RN  Outcome: Progressing     Problem: Safety - Adult  Goal: Free from fall injury  Outcome: Progressing

## 2023-05-17 VITALS
BODY MASS INDEX: 29.47 KG/M2 | SYSTOLIC BLOOD PRESSURE: 115 MMHG | WEIGHT: 199 LBS | DIASTOLIC BLOOD PRESSURE: 65 MMHG | HEART RATE: 83 BPM | OXYGEN SATURATION: 98 % | HEIGHT: 69 IN | TEMPERATURE: 96.9 F | RESPIRATION RATE: 18 BRPM

## 2023-05-17 PROBLEM — I25.10 CORONARY ARTERY DISEASE: Status: ACTIVE | Noted: 2023-05-17

## 2023-05-17 PROBLEM — J96.00 ACUTE RESPIRATORY FAILURE (HCC): Status: RESOLVED | Noted: 2023-05-15 | Resolved: 2023-05-17

## 2023-05-17 PROBLEM — I50.43 ACUTE ON CHRONIC HEART FAILURE WITH REDUCED EJECTION FRACTION AND DIASTOLIC DYSFUNCTION (HCC): Status: ACTIVE | Noted: 2023-05-17

## 2023-05-17 LAB
ANION GAP SERPL CALC-SCNC: 8 MMOL/L (ref 3–18)
BUN SERPL-MCNC: 11 MG/DL (ref 7–18)
BUN/CREAT SERPL: 12 (ref 12–20)
CALCIUM SERPL-MCNC: 8.3 MG/DL (ref 8.5–10.1)
CHLORIDE SERPL-SCNC: 106 MMOL/L (ref 100–111)
CO2 SERPL-SCNC: 24 MMOL/L (ref 21–32)
CREAT SERPL-MCNC: 0.9 MG/DL (ref 0.6–1.3)
ECHO AO ROOT DIAM: 2.2 CM
ECHO AO ROOT INDEX: 1.07 CM/M2
ECHO AV MEAN GRADIENT: 3 MMHG
ECHO AV MEAN VELOCITY: 0.8 M/S
ECHO AV PEAK GRADIENT: 4 MMHG
ECHO AV PEAK VELOCITY: 1 M/S
ECHO AV VELOCITY RATIO: 0.7
ECHO AV VTI: 17.5 CM
ECHO BSA: 2.1 M2
ECHO LA VOL 2C: 40 ML (ref 18–58)
ECHO LA VOL 4C: 41 ML (ref 18–58)
ECHO LA VOL BP: 39 ML (ref 18–58)
ECHO LA VOL/BSA BIPLANE: 19 ML/M2 (ref 16–34)
ECHO LA VOLUME AREA LENGTH: 41 ML
ECHO LA VOLUME INDEX A2C: 19 ML/M2 (ref 16–34)
ECHO LA VOLUME INDEX A4C: 20 ML/M2 (ref 16–34)
ECHO LA VOLUME INDEX AREA LENGTH: 20 ML/M2 (ref 16–34)
ECHO LV E' LATERAL VELOCITY: 7 CM/S
ECHO LV E' SEPTAL VELOCITY: 7 CM/S
ECHO LV EF PHYSICIAN: 35 %
ECHO LV FRACTIONAL SHORTENING: 18 % (ref 28–44)
ECHO LV INTERNAL DIMENSION DIASTOLE INDEX: 2.72 CM/M2
ECHO LV INTERNAL DIMENSION DIASTOLIC: 5.6 CM (ref 4.2–5.9)
ECHO LV INTERNAL DIMENSION SYSTOLIC INDEX: 2.23 CM/M2
ECHO LV INTERNAL DIMENSION SYSTOLIC: 4.6 CM
ECHO LV IVSD: 0.8 CM (ref 0.6–1)
ECHO LV MASS 2D: 165 G (ref 88–224)
ECHO LV MASS INDEX 2D: 80.1 G/M2 (ref 49–115)
ECHO LV POSTERIOR WALL DIASTOLIC: 0.8 CM (ref 0.6–1)
ECHO LV RELATIVE WALL THICKNESS RATIO: 0.29
ECHO LVOT AV VTI INDEX: 0.69
ECHO LVOT MEAN GRADIENT: 1 MMHG
ECHO LVOT PEAK GRADIENT: 2 MMHG
ECHO LVOT PEAK VELOCITY: 0.7 M/S
ECHO LVOT VTI: 12 CM
ECHO PV MAX VELOCITY: 0.9 M/S
ECHO PV PEAK GRADIENT: 3 MMHG
ECHO RV TAPSE: 1.5 CM (ref 1.7–?)
ECHO RVOT PEAK GRADIENT: 2 MMHG
ECHO RVOT PEAK VELOCITY: 0.6 M/S
GLUCOSE BLD STRIP.AUTO-MCNC: 137 MG/DL (ref 70–110)
GLUCOSE BLD STRIP.AUTO-MCNC: 193 MG/DL (ref 70–110)
GLUCOSE BLD STRIP.AUTO-MCNC: 233 MG/DL (ref 70–110)
GLUCOSE BLD STRIP.AUTO-MCNC: 271 MG/DL (ref 70–110)
GLUCOSE BLD STRIP.AUTO-MCNC: 337 MG/DL (ref 70–110)
GLUCOSE SERPL-MCNC: 189 MG/DL (ref 74–99)
HCT VFR BLD AUTO: 33.4 % (ref 36–48)
HGB BLD-MCNC: 9.9 G/DL (ref 13–16)
MAGNESIUM SERPL-MCNC: 2.1 MG/DL (ref 1.6–2.6)
POTASSIUM SERPL-SCNC: 3.5 MMOL/L (ref 3.5–5.5)
SODIUM SERPL-SCNC: 138 MMOL/L (ref 136–145)

## 2023-05-17 PROCEDURE — APPSS15 APP SPLIT SHARED TIME 0-15 MINUTES

## 2023-05-17 PROCEDURE — 83735 ASSAY OF MAGNESIUM: CPT

## 2023-05-17 PROCEDURE — 94640 AIRWAY INHALATION TREATMENT: CPT

## 2023-05-17 PROCEDURE — 6360000002 HC RX W HCPCS: Performed by: INTERNAL MEDICINE

## 2023-05-17 PROCEDURE — 6370000000 HC RX 637 (ALT 250 FOR IP): Performed by: INTERNAL MEDICINE

## 2023-05-17 PROCEDURE — 99232 SBSQ HOSP IP/OBS MODERATE 35: CPT | Performed by: INTERNAL MEDICINE

## 2023-05-17 PROCEDURE — 6370000000 HC RX 637 (ALT 250 FOR IP): Performed by: PHYSICIAN ASSISTANT

## 2023-05-17 PROCEDURE — 85014 HEMATOCRIT: CPT

## 2023-05-17 PROCEDURE — 99239 HOSP IP/OBS DSCHRG MGMT >30: CPT | Performed by: HOSPITALIST

## 2023-05-17 PROCEDURE — 82962 GLUCOSE BLOOD TEST: CPT

## 2023-05-17 PROCEDURE — 85018 HEMOGLOBIN: CPT

## 2023-05-17 PROCEDURE — 36415 COLL VENOUS BLD VENIPUNCTURE: CPT

## 2023-05-17 PROCEDURE — 80048 BASIC METABOLIC PNL TOTAL CA: CPT

## 2023-05-17 RX ORDER — METOPROLOL SUCCINATE 25 MG/1
12.5 TABLET, EXTENDED RELEASE ORAL DAILY
Status: DISCONTINUED | OUTPATIENT
Start: 2023-05-17 | End: 2023-05-18 | Stop reason: HOSPADM

## 2023-05-17 RX ORDER — SPIRONOLACTONE 25 MG/1
25 TABLET ORAL DAILY
Status: DISCONTINUED | OUTPATIENT
Start: 2023-05-18 | End: 2023-05-18 | Stop reason: HOSPADM

## 2023-05-17 RX ORDER — NITROGLYCERIN 0.4 MG/1
0.4 TABLET SUBLINGUAL EVERY 5 MIN PRN
Qty: 25 TABLET | Refills: 3 | Status: SHIPPED | OUTPATIENT
Start: 2023-05-17

## 2023-05-17 RX ORDER — METOPROLOL SUCCINATE 25 MG/1
12.5 TABLET, EXTENDED RELEASE ORAL DAILY
Qty: 30 TABLET | Refills: 3 | Status: SHIPPED | OUTPATIENT
Start: 2023-05-18

## 2023-05-17 RX ORDER — FUROSEMIDE 40 MG/1
40 TABLET ORAL PRN
Qty: 30 TABLET | Refills: 0 | Status: SHIPPED | OUTPATIENT
Start: 2023-05-17

## 2023-05-17 RX ORDER — SPIRONOLACTONE 25 MG/1
25 TABLET ORAL DAILY
Qty: 30 TABLET | Refills: 3 | Status: SHIPPED | OUTPATIENT
Start: 2023-05-18

## 2023-05-17 RX ORDER — BUDESONIDE AND FORMOTEROL FUMARATE DIHYDRATE 160; 4.5 UG/1; UG/1
2 AEROSOL RESPIRATORY (INHALATION) 2 TIMES DAILY
Qty: 10.2 G | Refills: 0 | Status: SHIPPED | OUTPATIENT
Start: 2023-05-17

## 2023-05-17 RX ORDER — FUROSEMIDE 40 MG/1
40 TABLET ORAL PRN
Status: DISCONTINUED | OUTPATIENT
Start: 2023-05-17 | End: 2023-05-18 | Stop reason: HOSPADM

## 2023-05-17 RX ORDER — ALBUTEROL SULFATE 90 UG/1
2 AEROSOL, METERED RESPIRATORY (INHALATION) 4 TIMES DAILY PRN
Qty: 18 G | Refills: 0 | Status: SHIPPED | OUTPATIENT
Start: 2023-05-17

## 2023-05-17 RX ORDER — PREDNISONE 20 MG/1
40 TABLET ORAL DAILY
Qty: 10 TABLET | Refills: 0 | Status: SHIPPED | OUTPATIENT
Start: 2023-05-18 | End: 2023-05-23

## 2023-05-17 RX ADMIN — TIMOLOL MALEATE 1 DROP: 5 SOLUTION OPHTHALMIC at 09:11

## 2023-05-17 RX ADMIN — ARFORMOTEROL TARTRATE 15 MCG: 15 SOLUTION RESPIRATORY (INHALATION) at 08:24

## 2023-05-17 RX ADMIN — ARFORMOTEROL TARTRATE 15 MCG: 15 SOLUTION RESPIRATORY (INHALATION) at 20:18

## 2023-05-17 RX ADMIN — ASPIRIN 81 MG CHEWABLE TABLET 81 MG: 81 TABLET CHEWABLE at 10:55

## 2023-05-17 RX ADMIN — BUDESONIDE 500 MCG: 0.5 INHALANT RESPIRATORY (INHALATION) at 20:18

## 2023-05-17 RX ADMIN — MEMANTINE 7.5 MG: 5 TABLET ORAL at 10:55

## 2023-05-17 RX ADMIN — BUDESONIDE 500 MCG: 0.5 INHALANT RESPIRATORY (INHALATION) at 08:24

## 2023-05-17 RX ADMIN — CYCLOSPORINE 1 DROP: 0.5 EMULSION OPHTHALMIC at 09:10

## 2023-05-17 RX ADMIN — ATORVASTATIN CALCIUM 40 MG: 40 TABLET, FILM COATED ORAL at 21:11

## 2023-05-17 RX ADMIN — PREDNISONE 40 MG: 20 TABLET ORAL at 09:11

## 2023-05-17 RX ADMIN — PREDNISOLONE ACETATE 1 DROP: 10 SUSPENSION/ DROPS OPHTHALMIC at 09:10

## 2023-05-17 RX ADMIN — MEMANTINE 7.5 MG: 5 TABLET ORAL at 21:10

## 2023-05-17 RX ADMIN — INSULIN LISPRO 3 UNITS: 100 INJECTION, SOLUTION INTRAVENOUS; SUBCUTANEOUS at 17:55

## 2023-05-17 RX ADMIN — HEPARIN SODIUM 5000 UNITS: 5000 INJECTION INTRAVENOUS; SUBCUTANEOUS at 14:25

## 2023-05-17 RX ADMIN — METOPROLOL SUCCINATE 12.5 MG: 25 TABLET, EXTENDED RELEASE ORAL at 11:06

## 2023-05-17 RX ADMIN — HEPARIN SODIUM 5000 UNITS: 5000 INJECTION INTRAVENOUS; SUBCUTANEOUS at 04:52

## 2023-05-17 RX ADMIN — INSULIN GLARGINE 6 UNITS: 100 INJECTION, SOLUTION SUBCUTANEOUS at 09:11

## 2023-05-17 ASSESSMENT — PAIN SCALES - GENERAL
PAINLEVEL_OUTOF10: 0

## 2023-05-17 NOTE — DISCHARGE SUMMARY
0.86 with adenosine infusion, pLCFX 30-40%, LAD with myocardial bridging, Diagonal 30-40% stenosis  LVEDP 9mmHg  RCA Dominant System    Consults:   Dr. Buzz House, pulmonary  , cardiology    Imaging studies:   05/15/23    TRANSTHORACIC ECHOCARDIOGRAM (TTE) COMPLETE (CONTRAST/BUBBLE/3D PRN) 05/15/2023  1:15 PM (Final)    Interpretation Summary    Left Ventricle: Moderately reduced left ventricular systolic function. EF by visual approximation is 35%. Left ventricle size is normal. Normal wall thickness. See diagram for wall motion findings. Abnormal diastolic function. Right Ventricle: Reduced systolic function. TAPSE is abnormal. TAPSE is 1.5 cm. Mitral Valve: Moderate regurgitation. Left Atrium: Left atrium size is normal. Left atrial volume index is normal (16-34 mL/m2). LA Vol Index A/L is 20 mL/m2.   Addendum by: Elva Franco MD on 5/17/2023 10:51 AM    Signed by: Elva Franco MD on 5/15/2023  1:15 PM     Discharge Medications:     Current Discharge Medication List        START taking these medications    Details   dapagliflozin (FARXIGA) 5 MG tablet Take 1 tablet by mouth every morning  Qty: 90 tablet, Refills: 1      metoprolol succinate (TOPROL XL) 25 MG extended release tablet Take 0.5 tablets by mouth daily  Qty: 30 tablet, Refills: 3    Associated Diagnoses: Congestive heart failure (CHF) ([facility])      predniSONE (DELTASONE) 20 MG tablet Take 2 tablets by mouth daily for 5 days  Qty: 10 tablet, Refills: 0      furosemide (LASIX) 40 MG tablet Take 1 tablet by mouth as needed (as needed for weight gain 2-3 lbs)  Qty: 30 tablet, Refills: 0    Associated Diagnoses: Congestive heart failure (CHF) (HCC)      spironolactone (ALDACTONE) 25 MG tablet Take 1 tablet by mouth daily  Qty: 30 tablet, Refills: 3    Associated Diagnoses: Congestive heart failure (CHF) (HCC)      budesonide-formoterol (SYMBICORT) 160-4.5 MCG/ACT AERO Inhale 2 puffs into the lungs 2 times daily  Qty: 10.2 g, Refills: 0

## 2023-05-17 NOTE — PROGRESS NOTES
conducted an initial consultation and Spiritual Assessment for Shira Brewster, who is a 67 y. o.,male. Patient's Primary Language is: [place]. According to the patient's EMR Holiness Affiliation is: Unknown. The reason the Patient came to the hospital is:   Patient Active Problem List    Diagnosis Date Noted    Acute respiratory failure ([facility] 05/15/2023    Congestive heart failure ([facility] 05/15/2023    Bronchospasm 05/15/2023    Type 2 diabetes mellitus with other specified complication ([facility] 19/33/8818    Dyslipidemia 05/15/2023    Microscopic hematuria 04/21/2015        The  provided the following Interventions:  Initiated a relationship of care and support. Explored issues of blake, belief, spirituality and Protestant/ritual needs while hospitalized. Listened empathically. Provided information about Spiritual Care Services. Chart reviewed. The following outcomes where achieved:   confirmed Patient's Holiness Affiliation. Patient expressed gratitude for 's visit. Assessment:  Patient does not have any Protestant/cultural needs that will affect patient's preferences in health care. There are no spiritual or Protestant issues which require intervention at this time. Plan:  Chaplains will continue to follow and will provide pastoral care on an as needed/requested basis.  recommends bedside caregivers page  on duty if patient shows signs of acute spiritual or emotional distress.     [address]   [phone]
A call is placed to [facility] and had spoken to Lizett. She states that paper work has been approved and Simone Godfrey will be delivered between 8:30 pm and 9:00 pm.    8:37 PM Mykel of [facility] is here with the Simone Godfrey. Education and instructions are given to patient by him. [place] Jones is worn to patient by Francisco Balderas.    9:58 PM Patient is discharged in no acute distress. Discharge instructions were given by CHEMA Bar during shift change.
[facility]. Pulmonary, Critical Care, and Sleep Medicine    progress note    Name: Jerry Willett MRN: [id number]   : 1950 Hospital: [facility]   Date: 2023        IMPRESSION:   Acute hypoxic respiratory failure secondary to acute bronchospasm-differential diagnosis acute exacerbation of asthmatic bronchitis versus cardiac asthma-acute CHF. Patient has responded to current treatment. S/p cardiac cath-formal report pending but patient states that he was told there was a single-vessel nonobstructive lesion and no indication for stents but will proceed with medical therapy  History of atrial fibrillation  Eosinophilia-8% noted on differential diagnosis suspicious for allergic etiologies  History of recent dyspnea on exertion and fatigue-needs further evaluation  Ex-smoker-quit 10 years back       Patient Active Problem List   Diagnosis    Microscopic hematuria    Acute respiratory failure (HCC)    Congestive heart failure (HCC)    Bronchospasm    Type 2 diabetes mellitus with other specified complication (HCC)    Dyslipidemia    Eosinophilia    Mild persistent asthma with acute exacerbation      RECOMMENDATIONS:   Oxygen-given to maintain saturation more than 92%-  Bronchodilators-I would treat him with structured bronchodilator regime-Brovona and Pulmicort started with as needed DuoNebs. -At discharge recommend Symbicort 2 puffs twice daily with as needed albuterol  Steroids-add prednisone 40 mg daily for 5 days for exacerbation -completed outpatient  Antibiotics-not needed  Aspiration precautions  We will check serum IgE level. If indicated will pursue further work-up and interventions  Out patient testing- PFT, 6 min walk,  Assess home Oxygen needs at discharge-likely to get weaned off  Will Follow in pulmonary clinic in 2 to 3 weeks to formulate additional treatment plan after reviewing PFT and IgE level for discussed with patient       Subjective:    Interval
[facility]. Pulmonary, Critical Care, and Sleep Medicine    progress note    Name: Shoaib Cleary MRN: [id number]   : 1950 Hospital: [facility]   Date: 2023        IMPRESSION:   Acute hypoxic respiratory failure secondary to acute bronchospasm-differential diagnosis acute exacerbation of asthmatic bronchitis versus cardiac asthma-acute CHF. Patient has responded to current treatment. S/p cardiac cath-formal report pending but patient states that he was told there was a single-vessel nonobstructive lesion and no indication for stents but will proceed with medical therapy  History of atrial fibrillation  Eosinophilia-8% noted on differential diagnosis suspicious for allergic etiologies  History of recent dyspnea on exertion and fatigue-needs further evaluation  Ex-smoker-quit 10 years back       Patient Active Problem List   Diagnosis    Microscopic hematuria    Acute respiratory failure (HCC)    Congestive heart failure (HCC)    Bronchospasm    Type 2 diabetes mellitus with other specified complication (HCC)    Dyslipidemia      RECOMMENDATIONS:   Oxygen-given to maintain saturation more than 92%-  Bronchodilators-I would treat him with structured bronchodilator regime-Brovona and Pulmicort started with as needed DuoNebs. -At discharge recommend Symbicort 2 puffs twice daily with as needed albuterol  Steroids-add prednisone 40 mg daily for 5 days for exacerbation -completed outpatient  Antibiotics-not needed  Aspiration precautions  We will check serum IgE level.   If indicated will pursue further work-up and interventions  Out patient testing- PFT, 6 min walk,  Assess home Oxygen needs at discharge-likely to get weaned off  Will Follow in pulmonary clinic in 2 to 3 weeks to formulate additional treatment plan after reviewing PFT and IgE level for discussed with patient       Subjective:   23     Patient returned from cardiac cath-had cath via radial
[facility]  Progress Note    Patient: Ysabel Bloom Age: 67 y.o. : 1950 MR#: [id number] SSN: [id number]  Date/Time: 2023    Subjective:     Patient is eating his lunch. No nausea vomiting. He said he had some hunger headaches earlier today. Shortness of breath is much better. No cough currently. No chest or abdominal pain. Assessment:     1. Acute hypoxic respiratory failure, multifactorial, currently resolved. 2.  Elevated BNP due to new onset systolic CHF with EF 04-88 percentage. 3.  Bronchospasm due to environmental dust exposure, currently resolved  4. Eosinophilia, better today  5. History of atrial fibrillation  6. Ex-smoker  7. Diabetes mellitus  8. Glaucoma  9. Dyslipidemia    PLAN:    Patient underwent cardiac cath earlier today. Report is pending. We will change patient to p.o.  Lasix and monitor renal function  We will add low-dose Lantus for better management of hyperglycemia secondary to diabetes mellitus and as well as steroid  Continue current nebs and steroids as recommended by pulmonologist  Continue eyedrops for glaucoma  Patient is on aspirin and Lipitor    Possible discharge home tomorrow if cleared by cardiologist and pulmonologist    Total time of taking care of this patient was greater than 40 minutes    Case discussed with:  [x]Patient  []Family  [x]Nursing  []Case Management    DVT Prophylaxis:  []Lovenox  [x]Hep SQ  []SCDs  []Coumadin   []On Heparin gtt    Objective:     VS: /79   Pulse 87   Temp 98 °F (36.7 °C) (Oral)   Resp 18   Ht 5' 9\" (1.753 m)   Wt 199 lb (90.3 kg)   SpO2 99%   BMI 29.39 kg/m²    Tmax/24hrs: Temp (24hrs), Av.8 °F (36.6 °C), Min:97.6 °F (36.4 °C), Max:98 °F (36.7 °C)    Input/Output:   Intake/Output Summary (Last 24 hours) at 2023 1243  Last data filed at 2023 1100  Gross per 24 hour   Intake 600 ml   Output 2975 ml   Net -2375 ml         General appearance - alert,
Monitor tech called to notify about pt having run of unsustained vtach, now in NSR. Pt alert and oriented x4, in no distress, VSS, denies any other s/sx. MD notified.
Physical Therapy    PT order received and chart reviewed. Spoke with pt in room who is sitting up in recliner fully dressed and hoping to be dc soon. Pt is independent with functional mobility with no AD. Pt gets up ad annmarie in room with no acute functional deficits, does not warrant PT eval. Will sign off with no discharge needs noted.  Thank you for this referral. Chaka Hoffman, PT, DPT
Right wrist R-Band removed, sterile hemostatic dressing applied. No bleeding or swelling. Normal radial pulse, normal distal circulation and neuro check. Safety spl;int applied, safety instructions reviewed with the patient.
SPO2 walk test done - O2 remained 96% - 98%.
Spoke to Dr. Lovell and to  Bev NOWAK and got ph# for Lynda at [employer] [phone]. Asked Lynda for status on delivering cardiac shock vest to patient's room. She said she received the appropriate notes from Dr. Green in Cardiology and now they are getting the vest prepared. A rep from their company will come here with the vest in about 2 hours.     
This nurse brought patient's home med eye drops to pharmacy to create a label and placed in patient specific bin.
SUMMARY:     Past Medical History:   Diagnosis Date    Diabetes mellitus ([facility]     Hypertension      Past Surgical History:   Procedure Laterality Date    EYE SURGERY         Home Situation:   Social/Functional History  Lives With: Alone  Type of Home: House  Home Layout: One level  Home Access: Stairs to enter with rails  Entrance Stairs - Number of Steps: 3  Bathroom Shower/Tub: Tub/Shower unit  ADL Assistance: Independent  [x]  Right hand dominant   []  Left hand dominant    Cognitive/Behavioral Status:  Orientation  Orientation Level: Oriented X4       Skin: intact  Edema: None noted    Vision/Perceptual:    Vision  Vision: Impaired  Vision Exceptions: Legally blind (L eye)       Coordination: BUE  Coordination: Within functional limits    Balance:  Balance  Sitting: Intact  Standing: Intact    Strength: BUE  Strength: Within functional limits    Tone & Sensation: BUE  Tone: Normal  Sensation: Intact    Range of Motion: BUE  AROM: Within functional limits    Functional Mobility and Transfers for ADLs:  Bed Mobility:  Bed Mobility Training  Bed Mobility Training: Yes  Supine to Sit: Modified independent  Sit to Supine: Modified independent    Transfers:  Transfer Training  Transfer Training: Yes  Sit to Stand: Modified independent  Stand to Sit: Modified independent    ADL Assessment:   Feeding: Independent  Grooming: Independent  UE Bathing: Independent  LE Bathing: Independent  UE Dressing: Independent  LE Dressing: Independent (able to tailor sit)  Toileting: Independent    Pain:  Pain level pre-treatment: 0/10   Pain level post-treatment: 0/10   Pain Intervention(s): Rest, Ice, Repositioning   Response to intervention: Nurse notified    Activity Tolerance:   Activity Tolerance: Patient Tolerated treatment well  Please refer to the flowsheet for vital signs taken during this treatment.     After treatment:   [] Patient left in no apparent distress sitting up in chair  [x] Patient left in no apparent distress in
injection vial 6 Units  6 Units SubCUTAneous Daily    atorvastatin (LIPITOR) tablet 40 mg  40 mg Oral Nightly    arformoterol tartrate (BROVANA) nebulizer solution 15 mcg  15 mcg Nebulization BID    budesonide (PULMICORT) nebulizer suspension 500 mcg  0.5 mg Nebulization BID    ipratropium-albuterol (DUONEB) nebulizer solution 1 ampule  1 ampule Inhalation Q4H PRN    predniSONE (DELTASONE) tablet 40 mg  40 mg Oral Daily    aspirin chewable tablet 81 mg  81 mg Oral Daily    latanoprost (XALATAN) 0.005 % ophthalmic solution 1 drop  1 drop Both Eyes Nightly    prednisoLONE acetate (PRED FORTE) 1 % ophthalmic suspension 1 drop  1 drop Left Eye Daily    timolol (TIMOPTIC) 0.5 % ophthalmic solution 1 drop  1 drop Right Eye BID    acetaminophen (TYLENOL) tablet 650 mg  650 mg Oral Q4H PRN    ondansetron (ZOFRAN) injection 4 mg  4 mg IntraVENous Q6H PRN    bisacodyl (DULCOLAX) suppository 10 mg  10 mg Rectal Daily PRN    heparin (porcine) injection 5,000 Units  5,000 Units SubCUTAneous 3 times per day    insulin lispro (HUMALOG) injection vial 0-4 Units  0-4 Units SubCUTAneous TID WC    insulin lispro (HUMALOG) injection vial 0-4 Units  0-4 Units SubCUTAneous Nightly    glucose chewable tablet 16 g  4 tablet Oral PRN    dextrose bolus 10% 125 mL  125 mL IntraVENous PRN    Or    dextrose bolus 10% 250 mL  250 mL IntraVENous PRN    glucagon (rDNA) injection 1 mg  1 mg SubCUTAneous PRN    dextrose 10 % infusion   IntraVENous Continuous PRN    cycloSPORINE (RESTASIS) 0.05 % ophthalmic emulsion 1 drop  1 drop Both Eyes BID    memantine (NAMENDA) tablet 7.5 mg  7.5 mg Oral BID    brinzolamide-brimonidine 1-0.2 % SUSP 1 drop (Patient Supplied)  1 drop Both Eyes TID         Intake/Output Summary (Last 24 hours) at 5/17/2023 0936  Last data filed at 5/16/2023 2037  Gross per 24 hour   Intake 440 ml   Output 25 ml   Net 415 ml       Physical Exam:  General:  alert, appears stated age, and cooperative  Neck:  no JVD  Lungs:  clear to
No

## 2023-05-17 NOTE — CARE COORDINATION
Lakisha Brown with [facility] called CM, asking for updated Cardiology note said Dr. Bear Lucio just updated Cardiology note, and needs to be faxed to them. CM faxed updated Cardiology note to Lakisha Brown with [facility] to [phone].              Danae Severino RN  Case Management [phone]

## 2023-05-17 NOTE — DISCHARGE INSTRUCTIONS
DISCHARGE SUMMARY from Nurse    PATIENT INSTRUCTIONS:    After general anesthesia or intravenous sedation, for 24 hours or while taking prescription Narcotics:  Limit your activities  Do not drive and operate hazardous machinery  Do not make important personal or business decisions  Do  not drink alcoholic beverages  If you have not urinated within 8 hours after discharge, please contact your surgeon on call. Report the following to your surgeon:  Excessive pain, swelling, redness or odor of or around the surgical area  Temperature over 100.5  Nausea and vomiting lasting longer than 4 hours or if unable to take medications  Any signs of decreased circulation or nerve impairment to extremity: change in color, persistent  numbness, tingling, coldness or increase pain  Any questions    What to do at Home:  Recommended activity: activity as tolerated,  home with life vest    If you experience any of the following symptoms  nausea, vomiting, diarrhea, shortness of breath, fever over 101, dizziness fainting, or any other issues or concerns, please follow up with  primary care physician or dial 911 for emergencies. *  Please give a list of your current medications to your Primary Care Provider. *  Please update this list whenever your medications are discontinued, doses are      changed, or new medications (including over-the-counter products) are added. *  Please carry medication information at all times in case of emergency situations. These are general instructions for a healthy lifestyle:    No smoking/ No tobacco products/ Avoid exposure to second hand smoke  Surgeon General's Warning:  Quitting smoking now greatly reduces serious risk to your health.     Obesity, smoking, and sedentary lifestyle greatly increases your risk for illness    A healthy diet, regular physical exercise & weight monitoring are important for maintaining a healthy lifestyle    You may be retaining fluid if you have a history of

## 2023-05-17 NOTE — CARE COORDINATION
05/17/23 1810   Service Assessment   Patient Orientation Alert and Oriented;Person;Place;Situation;Self   Cognition Alert   History Provided By Patient   Primary Caregiver Self   Support Systems Family Members   PCP Verified by CM Yes  (CM updated patient's PCP in chart.)   Prior Functional Level Independent in ADLs/IADLs   Current Functional Level Independent in ADLs/IADLs   Can patient return to prior living arrangement Yes   Ability to make needs known: Good   Family able to assist with home care needs: Yes   Social/Functional History   Lives With Alone   Type of [address] One level   Home Access Stairs to enter with rails   Entrance Stairs - Number of Steps 3 Steps to Enter the home. Entrance Stairs - Rails Both   Bathroom Shower/Tub Tub/Shower unit   Bathroom Toilet Standard   Bathroom Equipment None   Bathroom Accessibility Accessible   Home Equipment   (Patient was not using any DME in the home.)   ADL Assistance Independent   Homemaking Assistance Independent   Ambulation Assistance Independent   Transfer Assistance Independent   Active  Yes   Mode of Transportation Car   Occupation Retired   Discharge Planning   Type of [address] [place] [address] Prior To Admission None   Potential Assistance Needed Other (Comment)  (LifeVest)   DME Ordered?  Other (comment)  (LifeVest)   Potential Assistance Purchasing Medications No   Type of Home Care Services None   Patient expects to be discharged to: House  (Staying with sister.)   Services At/After Discharge   Transition of Care Consult (CM Consult) N/A   Services At/After Discharge None   Mode of Transport at Discharge Self  (Patient's sister will be transporting patient home at time of discharge.)   Confirm Follow Up Transport Self

## 2023-05-17 NOTE — CARE COORDINATION
CM spoke with patient, patient upset LifeVest is not here, and Idalia Coral was not with Discharge Medications delivered to him. CM let patient know that his nurse Mello Enriquez called Neri Ghazala with [employer], and [employer] rep to be here within 2 hours with LifeVest.   Patient gave CM patient's new address in 2 weeks for [facility], and asked that CM update [place] address in chart. Patient gave CM PCP name. Patient gave CM his 2 sisters name and phone numbers for emergency contacts. Patient will be staying with his sister Cynthia King after discharge for 2 weeks, then patient will be moving to [facility]. Patient's sister's Daly's address is [address] [place] [address], [place], [place] [address]. Patient's sister will be transporting patient home today after LifeVest is delivered, and patient trained on the [address]   Patient said he would follow up with Outpatient pharmacy for Idalia Coral medication tomorrow. Wilian Hernandez, and asked if Idalia Coral script was sent to Outpatient pharmacy. Dr. Demarco Hernandez said Cardiology to send Idalia Coral script to Outpatient Pharmacy. Wilian Fontaine, and updated that patient did not receive Farxiga medication, and Dr. Demarco Hernandez said Cardiology to do. CM asked Dr. Frances Fontaine to please send script to Outpatient pharmacy to fill, and patient will reach out to Outpatient pharmacy tomorrow. Dr. Frances Fontaine said ok. CM spoke with patient and updated him that Dr. Frances Fontaine to send Idalia Coral script to Outpatient pharmacy to fill. Patient said he will call Outpatient pharmacy tomorrow to follow up. CM updated home address in chart, PCP in chart, and patient's emergency contacts in patient's chart.            Afsaneh Tanner, RN  Case Management [phone]

## 2023-05-17 NOTE — CARE COORDINATION
D/C order noted for today. Orders reviewed. [employer] coming within 2 hours with LifeVest. Patient to follow up with Outpatient pharmacy tomorrow for [place]. Patient's sister will be transporting patient home tonight after LifeVest delivered, and teaching. CM remains available if needed.            Albaro Warner, [phone]

## 2023-05-17 NOTE — CARE COORDINATION
Mahin Salas with [facility] called [facility], said she needs LifeVest order and clinicals. [facility] faxed LifeVest order with clinicals to Mahin Salas to [phone]. [facility] updated [facility] that patient needs LifeVest before discharge, and clinicals faxed to [facility].             Maite Bishop, RN  Case Management [phone]

## 2023-05-18 PROBLEM — J96.01 ACUTE RESPIRATORY FAILURE WITH HYPOXIA (HCC): Status: ACTIVE | Noted: 2023-05-15

## 2023-05-18 NOTE — CARE COORDINATION
Late Entry 05/18/2023 8:31 am      Dr. Mickey Cabral yesterday at 7:38 pm, said Reginold Cristina script was sent to Outpatient pharmacy. CM spoke with Dr. Christian Kirk this morning, and updated with above information. CM called patient, and updated that Dr. Devika Blake sent Reginolana maría Cristina script last night to the Outpatient pharmacy to fill, and that Outpatient pharmacy opens at 9 am.   Patient said he has the phone number to Outpatient pharmacy, and will call this morning, and plans to  medication today.              Tamela Vera, RN  Case Management [phone]

## 2023-05-19 LAB — IGE SERPL-ACNC: 821 IU/ML (ref 6–495)

## (undated) DEVICE — GUIDEWIRE VASC STR 0.014 INX185 CM PRESSURE VOLCANO VERRATA

## (undated) DEVICE — SET FLD ADMIN 3 W STPCOCK FIX FEM L BOR 1IN

## (undated) DEVICE — CATHETER GUID JR5 0.070 INX6 FRX100 CM VISTA BRT TIP

## (undated) DEVICE — PROCEDURE KIT FLUID MGMT 10 FR CUST MAINFOLD

## (undated) DEVICE — CATHETER ANGIO 5FR L100CM GRY S STL NYL JR4 3 SEG BRAID L

## (undated) DEVICE — GUIDEWIRE VASC L260CM DIA0035IN TIP L3MM PTFE J STD TAPR FIX

## (undated) DEVICE — SHEATH RAIN RAD INTRO SHTH W/ BARE WIRE 10 CM 506610S

## (undated) DEVICE — COVER US PRB W15XL120CM W/ GEL RUBBERBAND TAPE STRP FLD GEN

## (undated) DEVICE — CATHETER ANGIO 5FR L100CM GRY S STL NYL JL3.5 3 SEG BRAID L

## (undated) DEVICE — DEVICE INFL W ACCS + HEMSTAS VLV INSRT TOOL AND TORQ BASIX

## (undated) DEVICE — PRESSURE MONITORING SET: Brand: TRUWAVE

## (undated) DEVICE — BAND COMPR L24CM REG CLR PLAS HEMSTAT EXT HK AND LOOP RETEN

## (undated) DEVICE — DRAPE,ANGIO,BRACH,STERILE,38X44: Brand: MEDLINE

## (undated) DEVICE — PACK PROCEDURE SURG VASC CATH 161 MMC LF